# Patient Record
(demographics unavailable — no encounter records)

---

## 2025-04-01 NOTE — HISTORY OF PRESENT ILLNESS
[FreeTextEntry1] : 44yo  here for wellness. Reg menses. Underwent a few cycles of IUI, not planning to undergo more SEVERINO treatment at this point but would be open to spontaneous pregnancy. No complaints.  5cm fibroid  POB:  23 after IOL at 39 wk for AMA, PEC without SF "Tino Hansen" (IVF pregnancy)

## 2025-04-01 NOTE — PLAN
[FreeTextEntry1] : Pap done, mammo given. To monitor cycles.  Patient screened for depression - no signs of clinical depression. PHQ-9 scores reviewed over the course of the visit 5-10minutes of face to face time. Follow up with changes in mood including other symptoms of anxiety.